# Patient Record
Sex: MALE | Race: WHITE | NOT HISPANIC OR LATINO | Employment: FULL TIME | URBAN - METROPOLITAN AREA
[De-identification: names, ages, dates, MRNs, and addresses within clinical notes are randomized per-mention and may not be internally consistent; named-entity substitution may affect disease eponyms.]

---

## 2019-11-12 ENCOUNTER — HOSPITAL ENCOUNTER (EMERGENCY)
Facility: MEDICAL CENTER | Age: 44
End: 2019-11-12
Attending: EMERGENCY MEDICINE
Payer: MEDICAID

## 2019-11-12 VITALS
TEMPERATURE: 98.1 F | HEART RATE: 109 BPM | SYSTOLIC BLOOD PRESSURE: 116 MMHG | WEIGHT: 142.2 LBS | OXYGEN SATURATION: 99 % | RESPIRATION RATE: 18 BRPM | BODY MASS INDEX: 22.85 KG/M2 | HEIGHT: 66 IN | DIASTOLIC BLOOD PRESSURE: 71 MMHG

## 2019-11-12 DIAGNOSIS — B95.8 STAPH SKIN INFECTION: ICD-10-CM

## 2019-11-12 DIAGNOSIS — L08.9 STAPH SKIN INFECTION: ICD-10-CM

## 2019-11-12 PROCEDURE — A9270 NON-COVERED ITEM OR SERVICE: HCPCS | Performed by: EMERGENCY MEDICINE

## 2019-11-12 PROCEDURE — 99284 EMERGENCY DEPT VISIT MOD MDM: CPT

## 2019-11-12 PROCEDURE — 700102 HCHG RX REV CODE 250 W/ 637 OVERRIDE(OP): Performed by: EMERGENCY MEDICINE

## 2019-11-12 RX ORDER — AMOXICILLIN AND CLAVULANATE POTASSIUM 875; 125 MG/1; MG/1
1 TABLET, FILM COATED ORAL ONCE
Status: COMPLETED | OUTPATIENT
Start: 2019-11-12 | End: 2019-11-12

## 2019-11-12 RX ORDER — AMOXICILLIN AND CLAVULANATE POTASSIUM 875; 125 MG/1; MG/1
1 TABLET, FILM COATED ORAL 2 TIMES DAILY
Qty: 20 TAB | Refills: 0 | Status: SHIPPED | OUTPATIENT
Start: 2019-11-12

## 2019-11-12 RX ADMIN — IBUPROFEN 800 MG: 600 TABLET ORAL at 21:48

## 2019-11-12 RX ADMIN — AMOXICILLIN AND CLAVULANATE POTASSIUM 1 TABLET: 875; 125 TABLET, FILM COATED ORAL at 21:48

## 2019-11-12 SDOH — HEALTH STABILITY: MENTAL HEALTH: HOW OFTEN DO YOU HAVE A DRINK CONTAINING ALCOHOL?: 2-3 TIMES A WEEK

## 2019-11-12 SDOH — HEALTH STABILITY: MENTAL HEALTH: HOW OFTEN DO YOU HAVE 6 OR MORE DRINKS ON ONE OCCASION?: NEVER

## 2019-11-12 SDOH — HEALTH STABILITY: MENTAL HEALTH: HOW MANY STANDARD DRINKS CONTAINING ALCOHOL DO YOU HAVE ON A TYPICAL DAY?: 3 OR 4

## 2019-11-12 ASSESSMENT — LIFESTYLE VARIABLES
DO YOU DRINK ALCOHOL: NO
DOES PATIENT WANT TO STOP DRINKING: NO

## 2019-11-13 NOTE — DISCHARGE INSTRUCTIONS
Keep the lesions clean daily with antibacterial soap and water  Use a bandage over the wounds when you are at work to keep them clean but otherwise when you are at home keep them open to air and dry.  Take the medications as directed and the antibiotics until completely gone.  Follow-up with the [facility] clinic in 1 week for wound recheck.

## 2019-11-13 NOTE — ED NOTES
Reviewed HR with ronnell Lozano'fran HR for dispo.  D/c papers given and prescriptions given, pt verbalized understanding.  Resp even ul, skin warm and dry, NAD noted. Pt ambulates with steady gait to ED exit.

## 2019-11-13 NOTE — ED TRIAGE NOTES
"Marco Gary  Chief Complaint   Patient presents with   • Abscess     Pt complaining of mutliple absces's to his right knee and one in the pubic region that have been present for appx 3-4 days. Pt endorses using APAP for symptom control with minimal relief.      Pt ambulatory to triage with above complaint.     /79   Pulse (!) 130   Temp 36.3 °C (97.3 °F) (Temporal)   Resp 16   Ht 1.676 m (5' 6\")   Wt 64.5 kg (142 lb 3.2 oz)   SpO2 98%   BMI 22.95 kg/m²     Pt informed of triage process and encouraged to notify staff of any changes or concerns. Pt verbalized understanding of instructions. Apologized for long wait time. Pt placed back in lobby.     "

## 2019-11-13 NOTE — ED NOTES
Pt ambulated to room 70, admits having abscesses with drainage to R knee x3, scabbed over at this time, mild redness around site.  ERP to bedside.

## 2019-11-13 NOTE — ED PROVIDER NOTES
ED Provider Note     Scribed for Claire Carrasco D.O. by Jama Knapp. 11/12/2019, 9:16 PM.     Primary care provider: None noted  Means of arrival: Walk-In   History obtained from: Patient  History limited by: None    CHIEF COMPLAINT  Chief Complaint   Patient presents with   • Abscess     Pt complaining of mutliple absces's to his right knee and one in the pubic region that have been present for appx 3-4 days. Pt endorses using APAP for symptom control with minimal relief.      HPI  Marco Gary is a 44 y.o. male who presents to the emergency Department for evaluation of multiple skin lesions which began 4 days ago. There are 3 lesions, and they are localized to his right knee. He denies any fevers. The patient reports that his job frequently requires him to get dirty. The patient admits to a history of smoking marijuana but denies any other drug use including IV drug abuse or skin popping. He denies any history of diabetes, hypertension, seizures, asthma, or any other medical problems. He adds that he had a tetanus shot 4-5 years ago.    REVIEW OF SYSTEMS  Pertinent positives include abscess.   Pertinent negatives include no fever.   See HPI for further details. All other systems are negative.    PAST MEDICAL HISTORY  History reviewed. No pertinent past medical history.    FAMILY HISTORY  History reviewed. No pertinent family history.    SOCIAL HISTORY  Social History     Tobacco Use   • Smoking status: Current Every Day Smoker     Packs/day: 0.50   • Smokeless tobacco: Never Used   Substance Use Topics   • Alcohol use: Yes     Frequency: 2-3 times a week     Drinks per session: 3 or 4     Binge frequency: Never   • Drug use: Yes     Types: Inhaled     Comment: thc daily      Social History     Substance and Sexual Activity   Drug Use Yes   • Types: Inhaled    Comment: thc daily     SURGICAL HISTORY  History reviewed. No pertinent surgical history.    CURRENT MEDICATIONS  No current outpatient medications on  "file.    ALLERGIES  No Known Allergies    PHYSICAL EXAM  VITAL SIGNS: /79   Pulse (!) 130   Temp 36.3 °C (97.3 °F) (Temporal)   Resp 16   Ht 1.676 m (5' 6\")   Wt 64.5 kg (142 lb 3.2 oz)   SpO2 98%   BMI 22.95 kg/m²     Constitutional: Patient is well developed, well nourished. Non-toxic appearing. Mild Distress.   HENT: Normocephalic, Nose normal with no drainage. Oropharynx moist without erythema.  Eyes: PERRL, EOMI, Conjunctiva without erythema.   Neck: Supple with no anterior/posterior cervical adenopathy. Normal range of motion in flexion, extension and lateral rotation. No tenderness along the bony prominences or paraspinal muscles.  Cardiovascular: Normal heart rate and Regular rhythm. No murmur.  Thorax & Lungs: Clear and equal breath sounds with good excursion. No respiratory distress  Abdomen: Bowel sounds normal in all four quadrants. Soft, non tender.  Skin: Warm, Dry, 3 1.5-2 cm lesions on the right knee, knee has full range of motion. 2 cm area of surrounding erythema around each lesion with no subcutaneous abscess formation. NVI.   Extremities: Peripheral pulses 4/4 No edema, no joint effusion or surrounding erythema other than on the lesions.  Musculoskeletal: Normal range of motion in all major joints.   Neurologic: Alert & oriented x 3, Normal motor function, Normal sensory function, No lateralizing or focal deficits noted. DTR's 4/4 bilaterally.  Psychiatric: Affect odd, Judgment normal, Mood normal.     COURSE & MEDICAL DECISION MAKING  Pertinent Labs & Imaging studies reviewed. (See chart for details)    9:16 PM - Patient seen and evaluated at bedside. Differential diagnoses include, but are not limited to, staph infection. I informed patient that since it is likely a staph infection, and the treatment for this is oral antibiotics and an antibiotic cream. I advised that since patient works at a job that requires him to get dirty that he needs to ensure his lesions are covered at all " times while at work, but he should let them get air when he is at home. I informed him that I will be giving him an antibiotic right now and he agrees to go to the pharmacy in the morning. Patient understands that he should return for any new or worsening symptoms such as fever, redness, swelling, or any other acute medical problems. I discussed the plan of discharge as outlined below and the patient verbalizes agreement and understands.  He was given Augmentin here in the ER and prescriptions for Augmentin and Bactroban ointment for home.  He is to keep the lesions clean daily with antibacterial soap and water keep them open to air as much as possible and covered when he is at work and return sooner if elevated fever increased redness swelling.  He is discharged in stable and improved condition to follow-up with the [facility] clinic in 1 week.    The patient is referred to a primary physician for blood pressure management, diabetic screening, and for all other preventative health concerns.    DISPOSITION:  Patient will be discharged home in stable condition.    FOLLOW UP:  [address] [address] [phone]  Schedule an appointment as soon as possible for a visit in 1 week  For wound re-check    OUTPATIENT MEDICATIONS:  New Prescriptions    AMOXICILLIN-CLAVULANATE (AUGMENTIN) 875-125 MG TAB    Take 1 Tab by mouth 2 times a day.    MUPIROCIN (BACTROBAN) 2 % OINTMENT    Apply 1 Application to affected area(s) 2 times a day for 7 days.     FINAL IMPRESSION  1. Staph skin infection      Jama FINN (Wes), am scribing for, and in the presence of, Claire Carrasco D.O..    Electronically signed by: Jama Knapp (Wes), 11/12/2019    IClaire D.O. personally performed the services described in this documentation, as scribed by Jama Knapp in my presence, and it is both accurate and complete.    E    The note accurately reflects work and  decisions made by me.  Claire Carrasco  11/12/2019  10:31 PM